# Patient Record
Sex: MALE | Race: WHITE | NOT HISPANIC OR LATINO | Employment: PART TIME | URBAN - NONMETROPOLITAN AREA
[De-identification: names, ages, dates, MRNs, and addresses within clinical notes are randomized per-mention and may not be internally consistent; named-entity substitution may affect disease eponyms.]

---

## 2020-03-06 ENCOUNTER — TRANSCRIBE ORDERS (OUTPATIENT)
Dept: ADMINISTRATIVE | Facility: HOSPITAL | Age: 20
End: 2020-03-06

## 2020-03-06 DIAGNOSIS — I86.1 LEFT VARICOCELE: Primary | ICD-10-CM

## 2020-03-09 ENCOUNTER — HOSPITAL ENCOUNTER (OUTPATIENT)
Dept: ULTRASOUND IMAGING | Facility: HOSPITAL | Age: 20
Discharge: HOME | End: 2020-03-09
Admitting: UROLOGY

## 2020-03-09 PROCEDURE — 76870 US EXAM SCROTUM: CPT

## 2023-03-31 ENCOUNTER — HOSPITAL ENCOUNTER (EMERGENCY)
Age: 23
Discharge: HOME | End: 2023-03-31
Attending: EMERGENCY MEDICINE
Payer: COMMERCIAL

## 2023-03-31 ENCOUNTER — APPOINTMENT (OUTPATIENT)
Dept: ULTRASOUND IMAGING | Age: 23
End: 2023-03-31
Payer: COMMERCIAL

## 2023-03-31 VITALS
BODY MASS INDEX: 20.46 KG/M2 | HEIGHT: 68 IN | TEMPERATURE: 98.2 F | HEART RATE: 84 BPM | RESPIRATION RATE: 16 BRPM | DIASTOLIC BLOOD PRESSURE: 86 MMHG | WEIGHT: 135 LBS | OXYGEN SATURATION: 98 % | SYSTOLIC BLOOD PRESSURE: 129 MMHG

## 2023-03-31 DIAGNOSIS — S30.22XA CONTUSION OF TESTIS, INITIAL ENCOUNTER: Primary | ICD-10-CM

## 2023-03-31 LAB
AMORPH SED URNS QL MICRO: ABNORMAL /HPF
BACTERIA #/AREA URNS HPF: ABNORMAL /HPF
BILIRUB UR STRIP.AUTO-MCNC: NEGATIVE MG/DL
CLARITY UR: ABNORMAL
COLOR UR: YELLOW
GLUCOSE UR STRIP.AUTO-MCNC: NEGATIVE MG/DL
HGB UR STRIP.AUTO-MCNC: ABNORMAL MG/L
KETONES UR STRIP.AUTO-MCNC: ABNORMAL MG/DL
LEUKOCYTE ESTERASE UR QL STRIP.AUTO: NEGATIVE
MUCOUS THREADS URNS QL MICRO: ABNORMAL /LPF
NITRITE UR QL STRIP.AUTO: NEGATIVE
PH UR STRIP.AUTO: 7.5 [PH] (ref 5–8)
PROT UR STRIP.AUTO-MCNC: 100 MG/DL
RBC #/AREA URNS HPF: ABNORMAL /HPF (ref 0–2)
SP GR UR STRIP.AUTO: >=1.03 (ref 1–1.03)
SQUAMOUS #/AREA URNS HPF: ABNORMAL /HPF
UROBILINOGEN UR STRIP.AUTO-MCNC: 1 E.U./DL
WBC #/AREA URNS HPF: ABNORMAL /HPF (ref 0–5)

## 2023-03-31 PROCEDURE — 81001 URINALYSIS AUTO W/SCOPE: CPT

## 2023-03-31 PROCEDURE — 76870 US EXAM SCROTUM: CPT | Performed by: RADIOLOGY

## 2023-03-31 PROCEDURE — 99284 EMERGENCY DEPT VISIT MOD MDM: CPT

## 2023-03-31 PROCEDURE — 76870 US EXAM SCROTUM: CPT

## 2023-03-31 ASSESSMENT — ENCOUNTER SYMPTOMS
SHORTNESS OF BREATH: 0
NAUSEA: 0
VOMITING: 0
ABDOMINAL PAIN: 1
COUGH: 0

## 2023-03-31 ASSESSMENT — PAIN - FUNCTIONAL ASSESSMENT: PAIN_FUNCTIONAL_ASSESSMENT: 0-10

## 2023-03-31 NOTE — ED NOTES
Dr Everette Agee at bedside for testicular exam, this RN chaperone      Howard Nunn, RN  03/31/23 3356

## 2023-03-31 NOTE — ED PROVIDER NOTES
[facility]  eMERGENCY dEPARTMENT eNCOUnter      Pt Name: Arina Rios  MRN: [id number]  [place] 2000  Date of evaluation: 3/31/2023  Provider: Hitesh Meeks MD    [address] [place] [address]       Chief Complaint   Patient presents with    Testicle Pain     Left side started this morning, no complaints of complication with voiding          HISTORY OF PRESENT ILLNESS   (Location/Symptom, Timing/Onset,Context/Setting, Quality, Duration, Modifying Factors, Severity)  Note limiting factors. Arina Rios is a 25 y.o. male who presents to the emergency department for evaluation regarding left-sided testicular pain. Patient states that this pain began today and has been somewhat intermittent in nature. He describes it as a dull aching pain involving his left testicle. Tells me that yesterday evening he was kicking a soccer ball and he had 2 episodes of very sharp pain that lasted approximately 2 and then resolved. He has not had any hematuria or dysuria symptoms. When the pain occurs he does note some radiation up into his abdominal cavity. Denies penile discharge. No prior history of similar symptoms. Does have a prior history of previous varicocele surgery and repair a couple of years ago. No new sexual partners. HPI    NursingNotes were reviewed. REVIEW OF SYSTEMS    (2-9 systems for level 4, 10 or more for level 5)     Review of Systems   Constitutional:  Negative for chills and fever. Respiratory:  Negative for cough and shortness of breath. Cardiovascular:  Negative for chest pain. Gastrointestinal:  Positive for abdominal pain. Negative for nausea and vomiting. Genitourinary:  Positive for testicular pain. Negative for decreased urine volume, flank pain, hematuria, penile discharge, penile swelling and scrotal swelling. All other systems reviewed and are negative. PAST MEDICALHISTORY   History reviewed. No pertinent past medical history.       SURGICAL HISTORY       Past soccer yesterday. We discussed symptomatic management with anti-inflammatories and testicular support. PROCEDURES:  Unless otherwise noted below, none     Procedures    FINAL IMPRESSION      1.  Contusion of testis, initial encounter          DISPOSITION/PLAN   DISPOSITION Decision To Discharge 03/31/2023 03:38:02 PM      [facility]:  [facility]  [phone]    As needed      (Please note that portions of this note were completed with a voice recognition program.  Efforts were made to edit thedictations but occasionally words are mis-transcribed.)    Tremayne Goodson MD (electronically signed)  Attending Emergency Physician         Tremayne Goodson MD  03/31/23 0803